# Patient Record
Sex: MALE | Race: WHITE
[De-identification: names, ages, dates, MRNs, and addresses within clinical notes are randomized per-mention and may not be internally consistent; named-entity substitution may affect disease eponyms.]

---

## 2018-12-03 ENCOUNTER — HOSPITAL ENCOUNTER (INPATIENT)
Dept: HOSPITAL 4 - SED | Age: 48
LOS: 3 days | Discharge: HOME | DRG: 291 | End: 2018-12-06
Payer: MEDICAID

## 2018-12-03 VITALS — SYSTOLIC BLOOD PRESSURE: 134 MMHG

## 2018-12-03 VITALS — SYSTOLIC BLOOD PRESSURE: 105 MMHG

## 2018-12-03 VITALS — SYSTOLIC BLOOD PRESSURE: 122 MMHG

## 2018-12-03 VITALS — SYSTOLIC BLOOD PRESSURE: 139 MMHG

## 2018-12-03 VITALS — SYSTOLIC BLOOD PRESSURE: 152 MMHG

## 2018-12-03 VITALS — SYSTOLIC BLOOD PRESSURE: 131 MMHG

## 2018-12-03 VITALS — SYSTOLIC BLOOD PRESSURE: 118 MMHG

## 2018-12-03 VITALS — SYSTOLIC BLOOD PRESSURE: 146 MMHG

## 2018-12-03 VITALS — SYSTOLIC BLOOD PRESSURE: 123 MMHG

## 2018-12-03 VITALS — HEIGHT: 68 IN | BODY MASS INDEX: 28.64 KG/M2 | WEIGHT: 189 LBS

## 2018-12-03 VITALS — SYSTOLIC BLOOD PRESSURE: 121 MMHG

## 2018-12-03 DIAGNOSIS — Z91.14: ICD-10-CM

## 2018-12-03 DIAGNOSIS — R00.1: ICD-10-CM

## 2018-12-03 DIAGNOSIS — Z91.15: ICD-10-CM

## 2018-12-03 DIAGNOSIS — E11.22: ICD-10-CM

## 2018-12-03 DIAGNOSIS — I50.9: ICD-10-CM

## 2018-12-03 DIAGNOSIS — I13.2: Primary | ICD-10-CM

## 2018-12-03 DIAGNOSIS — E87.1: ICD-10-CM

## 2018-12-03 DIAGNOSIS — R07.9: ICD-10-CM

## 2018-12-03 DIAGNOSIS — F12.90: ICD-10-CM

## 2018-12-03 DIAGNOSIS — Z86.73: ICD-10-CM

## 2018-12-03 DIAGNOSIS — Z87.891: ICD-10-CM

## 2018-12-03 DIAGNOSIS — N17.0: ICD-10-CM

## 2018-12-03 DIAGNOSIS — Z99.2: ICD-10-CM

## 2018-12-03 DIAGNOSIS — E87.5: ICD-10-CM

## 2018-12-03 DIAGNOSIS — N18.6: ICD-10-CM

## 2018-12-03 LAB
ALBUMIN SERPL BCP-MCNC: 3.5 G/DL (ref 3.4–4.8)
ALT SERPL W P-5'-P-CCNC: 15 U/L (ref 12–78)
ANION GAP SERPL CALCULATED.3IONS-SCNC: 10 MMOL/L (ref 5–15)
AST SERPL W P-5'-P-CCNC: 16 U/L (ref 10–37)
BASOPHILS # BLD AUTO: 0.1 K/UL (ref 0–0.2)
BASOPHILS NFR BLD AUTO: 1 % (ref 0–2)
BILIRUB SERPL-MCNC: 0.5 MG/DL (ref 0–1)
BUN SERPL-MCNC: 107 MG/DL (ref 8–21)
CALCIUM SERPL-MCNC: 8.8 MG/DL (ref 8.4–11)
CHLORIDE SERPL-SCNC: 96 MMOL/L (ref 98–107)
CREAT SERPL-MCNC: 19.78 MG/DL (ref 0.55–1.3)
EOSINOPHIL # BLD AUTO: 0.2 K/UL (ref 0–0.4)
EOSINOPHIL NFR BLD AUTO: 2.6 % (ref 0–4)
ERYTHROCYTE [DISTWIDTH] IN BLOOD BY AUTOMATED COUNT: 14.9 % (ref 9–15)
GFR SERPL CREATININE-BSD FRML MDRD: 3 ML/MIN (ref 90–?)
GLUCOSE SERPL-MCNC: 143 MG/DL (ref 70–99)
HCT VFR BLD AUTO: 44.7 % (ref 36–54)
HGB BLD-MCNC: 14.3 G/DL (ref 14–18)
INR PPP: 1 (ref 0.8–1.2)
LYMPHOCYTES # BLD AUTO: 0.9 K/UL (ref 1–5.5)
LYMPHOCYTES NFR BLD AUTO: 11.6 % (ref 20.5–51.5)
MCH RBC QN AUTO: 32 PG (ref 27–31)
MCHC RBC AUTO-ENTMCNC: 32 % (ref 32–36)
MCV RBC AUTO: 100 FL (ref 79–98)
MONOCYTES # BLD MANUAL: 0.5 K/UL (ref 0–1)
MONOCYTES # BLD MANUAL: 6.9 % (ref 1.7–9.3)
NEUTROPHILS # BLD AUTO: 5.9 K/UL (ref 1.8–7.7)
NEUTROPHILS NFR BLD AUTO: 77.9 % (ref 40–70)
PLATELET # BLD AUTO: 218 K/UL (ref 130–430)
POTASSIUM SERPL-SCNC: 6.9 MMOL/L (ref 3.5–5.1)
PROTHROMBIN TIME: 9.9 SECS (ref 9.5–12.5)
RBC # BLD AUTO: 4.48 MIL/UL (ref 4.2–6.2)
SODIUM SERPLBLD-SCNC: 132 MMOL/L (ref 136–145)
WBC # BLD AUTO: 7.6 K/UL (ref 4.8–10.8)

## 2018-12-03 PROCEDURE — 5A1D70Z PERFORMANCE OF URINARY FILTRATION, INTERMITTENT, LESS THAN 6 HOURS PER DAY: ICD-10-PCS

## 2018-12-03 PROCEDURE — G0378 HOSPITAL OBSERVATION PER HR: HCPCS

## 2018-12-03 RX ADMIN — HEPARIN SODIUM SCH UNITS: 5000 INJECTION, SOLUTION INTRAVENOUS; SUBCUTANEOUS at 20:49

## 2018-12-03 NOTE — NUR
Pt c/o SOB. RR 28/min. Sats 97%. Dialysis nurse here. Spoke with Dr. Lao and informed him 
of HR 29 on occasion, but mostly 30s and the rest of the VS. Orders left.

-------------------------------------------------------------------------------

Addendum: 12/03/18 at 1655 by Patti Skinner RN

-------------------------------------------------------------------------------

Pts monitor zenaida bowman at times, 2nd degree.

## 2018-12-03 NOTE — NUR
Patient will be admitted to care of .  Admitted to ICU unit.  Will go 
to room ICU6.  Belongings list completed.  Summary report printed. Report will 
be given at bedside. NALINI Poe received bedside report.

## 2018-12-03 NOTE — NUR
Not much difference in HR after atropine given. Tolerating dialysis. "Pt ststes he feels 
better already, I can feel it already."

## 2018-12-03 NOTE — NUR
Received pt from ER to ICU bed 6 via gurney. Pt alert and oriented. HR 35-42 upon arrival. 
Pt has a left arm AV shunt and an 18 IV to left hand from paramedics. Pt denies dizzyness. 
BP stable. 02 2L NC in use Sats 99%. Left upper arm with AV shunt with a good thrill and 
audible bruit. Lungs very diminished in bases. Pt denies SOB at this time. ABD soft with 
bowels sounds. No pedal edema. Socks on.Call bell in place and will continue to monitor pt.

## 2018-12-03 NOTE — NUR
PM SHIFT ASSESSMENT

Pt is alert and oriented. SR noted on monitor. SPO2 via NC @ 2L, RR even and unlabored. Skin 
intact. Pt is anuric. Safety precautions in place, call light within reach. Will continue to 
monitor.

## 2018-12-03 NOTE — NUR
Monitor shows HR 70's and SR. Pt comfortable in bed and states he feels much better. Eating 
dinner and has a good appetite.

## 2018-12-03 NOTE — NUR
PATIENT BIB AMBULANCE FOR LEFT SIDED CHEST PAIN x1.5 HOURS WHILE AT THE COURT 
HOUSE. PT A&Ox4. +DIZZINESS. CHEST PAIN PRESSURE LIKE AND SHARP. PAIN = 10/10. 
DR. CRUMP AT BEDSIDE AND AWARE. NEW ORDERS NOTED AND TO BE CARRIED OUT. PT 
STATES HAVING SHORTNESS OF BREATH AT ONSET OF CHEST PAIN. SPEAKING FULL 
SENTENCES AT THIS TIME. PT PLACED ON O2 VIA NC @ 2L/MIN. SIDE RAILS UP. WILL 
CONTINUE TO MONITOR.

## 2018-12-04 VITALS — SYSTOLIC BLOOD PRESSURE: 153 MMHG

## 2018-12-04 VITALS — SYSTOLIC BLOOD PRESSURE: 155 MMHG

## 2018-12-04 VITALS — SYSTOLIC BLOOD PRESSURE: 150 MMHG

## 2018-12-04 VITALS — SYSTOLIC BLOOD PRESSURE: 128 MMHG

## 2018-12-04 VITALS — SYSTOLIC BLOOD PRESSURE: 142 MMHG

## 2018-12-04 VITALS — SYSTOLIC BLOOD PRESSURE: 132 MMHG

## 2018-12-04 VITALS — SYSTOLIC BLOOD PRESSURE: 126 MMHG

## 2018-12-04 VITALS — SYSTOLIC BLOOD PRESSURE: 160 MMHG

## 2018-12-04 VITALS — SYSTOLIC BLOOD PRESSURE: 158 MMHG

## 2018-12-04 VITALS — SYSTOLIC BLOOD PRESSURE: 134 MMHG

## 2018-12-04 VITALS — SYSTOLIC BLOOD PRESSURE: 138 MMHG

## 2018-12-04 VITALS — SYSTOLIC BLOOD PRESSURE: 148 MMHG

## 2018-12-04 LAB
ANION GAP SERPL CALCULATED.3IONS-SCNC: 14 MMOL/L (ref 5–15)
BASOPHILS # BLD AUTO: 0 K/UL (ref 0–0.2)
BASOPHILS NFR BLD AUTO: 0.3 % (ref 0–2)
BUN SERPL-MCNC: 67 MG/DL (ref 8–21)
CALCIUM SERPL-MCNC: 8.8 MG/DL (ref 8.4–11)
CHLORIDE SERPL-SCNC: 95 MMOL/L (ref 98–107)
CREAT SERPL-MCNC: 15.04 MG/DL (ref 0.55–1.3)
EOSINOPHIL # BLD AUTO: 0.2 K/UL (ref 0–0.4)
EOSINOPHIL NFR BLD AUTO: 3.7 % (ref 0–4)
ERYTHROCYTE [DISTWIDTH] IN BLOOD BY AUTOMATED COUNT: 14.7 % (ref 9–15)
GFR SERPL CREATININE-BSD FRML MDRD: 5 ML/MIN (ref 90–?)
GLUCOSE SERPL-MCNC: 94 MG/DL (ref 70–99)
HCT VFR BLD AUTO: 45.1 % (ref 36–54)
HGB BLD-MCNC: 14.7 G/DL (ref 14–18)
LYMPHOCYTES # BLD AUTO: 0.8 K/UL (ref 1–5.5)
LYMPHOCYTES NFR BLD AUTO: 11.9 % (ref 20.5–51.5)
MCH RBC QN AUTO: 32 PG (ref 27–31)
MCHC RBC AUTO-ENTMCNC: 33 % (ref 32–36)
MCV RBC AUTO: 99 FL (ref 79–98)
MONOCYTES # BLD MANUAL: 0.4 K/UL (ref 0–1)
MONOCYTES # BLD MANUAL: 7 % (ref 1.7–9.3)
NEUTROPHILS # BLD AUTO: 5 K/UL (ref 1.8–7.7)
NEUTROPHILS NFR BLD AUTO: 77.1 % (ref 40–70)
PLATELET # BLD AUTO: 175 K/UL (ref 130–430)
POTASSIUM SERPL-SCNC: 4.5 MMOL/L (ref 3.5–5.1)
RBC # BLD AUTO: 4.55 MIL/UL (ref 4.2–6.2)
SODIUM SERPLBLD-SCNC: 133 MMOL/L (ref 136–145)
WBC # BLD AUTO: 6.4 K/UL (ref 4.8–10.8)

## 2018-12-04 RX ADMIN — HEPARIN SODIUM SCH UNITS: 5000 INJECTION, SOLUTION INTRAVENOUS; SUBCUTANEOUS at 08:23

## 2018-12-04 RX ADMIN — Medication SCH MG: at 11:29

## 2018-12-04 RX ADMIN — Medication SCH TAB: at 11:30

## 2018-12-04 RX ADMIN — HEPARIN SODIUM SCH UNITS: 5000 INJECTION, SOLUTION INTRAVENOUS; SUBCUTANEOUS at 20:50

## 2018-12-04 NOTE — NUR
rounds

patient is resting in bed, talking on the phone, vital signs done, no other needs at this 
time, patient refused bed alarm even after providing education on the benefits of it, bed at 
the lowest position, two side rails up, call light within reach, fall and aspiration 
precautions in place, limb alert band on.

## 2018-12-04 NOTE — NUR
OPENING NOTE

Patient resting in the bed comfortable. No acute distress. Respiration even and unlabored. 
AAO x 4. Denied of pain. Skin warm and dry to touch. SL intact to RAC, no redness, no 
swelling, patent. AV shunt intact to YUMI with thrill/bruit present. No bleeding, no swelling 
noted. Discussed the safety measure maintained. Bed locked in low position, side rails up. 
Call light within reached. Refused bed alarm, risk and benefit explained, verbally 
understanding. Will continue to monitor.

## 2018-12-04 NOTE — NUR
BP follow up

blood pressure was 134/72, will continue to monitor, patient is resting in bed, no other 
needs at this time, patient refused bed alarm even after providing education on the benefits 
of it, bed at the lowest position, two side rails up, call light within reach, fall and 
aspiration precautions in place, limb alert band on.

## 2018-12-04 NOTE — NUR
ROUND

Patient resting in the bed with eyes closed. No acute distress. Respiration even and 
unlabored. Safety measure maintained. Bed locked in low position, side rails up. Call light 
within reached. Continue to monitor.

## 2018-12-04 NOTE — NUR
opening note

patient is resting in bed, A&Ox4, assessment completed, educated on call light system and 
plan of care, patient verbalized understanding, no other needs at this time, patient refused 
bed alarm even after providing education on the benefits of it, bed at the lowest position, 
two side rails up, call light within reach, fall and aspiration precautions in place, limb 
alert band on.

## 2018-12-04 NOTE — NUR
Nutrition Update



Ghassan Scale 16 noted.

Pt admitted for symptomatic bradycardiac.

Diet: renal

BMI: 27.7 kg/m2



RD to follow per nutrition care standards.

## 2018-12-04 NOTE — NUR
DOWNGRADE TO TELEMETRY



DR. PURVIS AT BEDSIDE. ORDER RECEIVED TO TRANSFER TO TELEMETRY.  CONTINUING CARE IN ICU-6 
UNTIL TELEMETRY NURSE BECOMES AVAILABLE

## 2018-12-04 NOTE — NUR
closing note

walked with patient in [place], assisted back to bed, no other needs at this time, will endorse 
report to noc shift nurse, patient refused bed alarm, bed in lowest position, two side rails 
up, call light within reach, fall and aspiration precautions in place, limb alert band on.

## 2018-12-04 NOTE — NUR
rounds

vital signs completed, blood pressure was 172/102, will continue to monitor, patient is 
resting in bed, no other needs at this time, patient refused bed alarm even after providing 
education on the benefits of it, bed at the lowest position, two side rails up, call light 
within reach, fall and aspiration precautions in place, limb alert band on.

## 2018-12-04 NOTE — NUR
ROUNDS

patient is resting in bed, eyes closed breathing nonlabored, no other needs at this time, 
patient refused bed alarm even after providing education on the benefits of it, bed at the 
lowest position, two side rails up, call light within reach, fall and aspiration precautions 
in place, limb alert band on.

## 2018-12-04 NOTE — NUR
RN Opening Note



SBAR report received from endorsing nurse at bedside. Pt educated on unit safety and 
demonstrated use of call light. Bed in lowest position. Call light within reach. 



See VS flowsheet.

## 2018-12-05 VITALS — SYSTOLIC BLOOD PRESSURE: 147 MMHG

## 2018-12-05 VITALS — SYSTOLIC BLOOD PRESSURE: 162 MMHG

## 2018-12-05 VITALS — SYSTOLIC BLOOD PRESSURE: 128 MMHG

## 2018-12-05 VITALS — SYSTOLIC BLOOD PRESSURE: 149 MMHG

## 2018-12-05 VITALS — SYSTOLIC BLOOD PRESSURE: 176 MMHG

## 2018-12-05 LAB
ANION GAP SERPL CALCULATED.3IONS-SCNC: 18 MMOL/L (ref 5–15)
BASOPHILS # BLD AUTO: 0 K/UL (ref 0–0.2)
BASOPHILS NFR BLD AUTO: 0.3 % (ref 0–2)
BUN SERPL-MCNC: 87 MG/DL (ref 8–21)
CALCIUM SERPL-MCNC: 8.4 MG/DL (ref 8.4–11)
CHLORIDE SERPL-SCNC: 95 MMOL/L (ref 98–107)
CREAT SERPL-MCNC: 17.81 MG/DL (ref 0.55–1.3)
EOSINOPHIL # BLD AUTO: 0.2 K/UL (ref 0–0.4)
EOSINOPHIL NFR BLD AUTO: 4 % (ref 0–4)
ERYTHROCYTE [DISTWIDTH] IN BLOOD BY AUTOMATED COUNT: 15 % (ref 9–15)
GFR SERPL CREATININE-BSD FRML MDRD: 4 ML/MIN (ref 90–?)
GLUCOSE SERPL-MCNC: 82 MG/DL (ref 70–99)
HCT VFR BLD AUTO: 43.9 % (ref 36–54)
HGB BLD-MCNC: 14.1 G/DL (ref 14–18)
LYMPHOCYTES # BLD AUTO: 1 K/UL (ref 1–5.5)
LYMPHOCYTES NFR BLD AUTO: 18.8 % (ref 20.5–51.5)
MCH RBC QN AUTO: 32 PG (ref 27–31)
MCHC RBC AUTO-ENTMCNC: 32 % (ref 32–36)
MCV RBC AUTO: 99 FL (ref 79–98)
MONOCYTES # BLD MANUAL: 0.5 K/UL (ref 0–1)
MONOCYTES # BLD MANUAL: 9.2 % (ref 1.7–9.3)
NEUTROPHILS # BLD AUTO: 3.7 K/UL (ref 1.8–7.7)
NEUTROPHILS NFR BLD AUTO: 67.7 % (ref 40–70)
PLATELET # BLD AUTO: 162 K/UL (ref 130–430)
POTASSIUM SERPL-SCNC: 5.2 MMOL/L (ref 3.5–5.1)
RBC # BLD AUTO: 4.46 MIL/UL (ref 4.2–6.2)
SODIUM SERPLBLD-SCNC: 133 MMOL/L (ref 136–145)
WBC # BLD AUTO: 5.4 K/UL (ref 4.8–10.8)

## 2018-12-05 PROCEDURE — 5A1D70Z PERFORMANCE OF URINARY FILTRATION, INTERMITTENT, LESS THAN 6 HOURS PER DAY: ICD-10-PCS

## 2018-12-05 RX ADMIN — HEPARIN SODIUM SCH UNITS: 5000 INJECTION, SOLUTION INTRAVENOUS; SUBCUTANEOUS at 21:06

## 2018-12-05 RX ADMIN — Medication SCH MG: at 08:08

## 2018-12-05 RX ADMIN — HEPARIN SODIUM SCH UNITS: 5000 INJECTION, SOLUTION INTRAVENOUS; SUBCUTANEOUS at 08:10

## 2018-12-05 RX ADMIN — Medication SCH TAB: at 08:08

## 2018-12-05 NOTE — NUR
Patient request

to go to [facility] to see his grandson who is too young to visit inside the hospital, walked 
with the patient to the [facility] steady gait, will continue to monitor the patient. 

-------------------------------------------------------------------------------

Addendum: 12/05/18 at 1319 by Kwaku Montes RN

-------------------------------------------------------------------------------

Patient back to unit, stable condition.

## 2018-12-05 NOTE — NUR
Medications

patient resting in bed, awake, denies pain, educated on morning medications uses and 
potential side effects, he verbalized understanding and tolerated well, no other needs at 
this time, bed in lowest position, two side rails up, call light within reach, fall and 
aspiration precautions in place, continuing to monitor

## 2018-12-05 NOTE — NUR
Dietitian Recommendations 



* Recommend renal, 100 gm protein diet

LP, RD



Please refer to Nutrition Assessment for details.

## 2018-12-05 NOTE — NUR
RN rounds

patient out of bed, ambulating around his room and in the hallway, steady gait, in stable 
condition, continuing to monitor.

## 2018-12-05 NOTE — NUR
Hemodialysis/BP med

HD complete at this time, 3L off, patient BP improved will not administer one time dose of 
hydralazine, will re-check BP and administer 1400 dose if indicated, patient denies pain, in 
stable condition, continuing to monitor, no other needs at this time, bed in lowest 
position, two side rails up, call light within reach, fall and aspiration precautions in 
place. 

-------------------------------------------------------------------------------

Addendum: 12/05/18 at 1231 by Kwaku Montes RN

-------------------------------------------------------------------------------

Administered one time dose for BP med per MD orders, current HR 71 /101, continuing to 
monitor.

## 2018-12-05 NOTE — NUR
CLOSING NOTE

Patient resting in the bed and watching TV. No acute distress. Respiration even and 
unlabored. Denied of pain. Skin warm and dry to touch. SL intact to RAC, no redness, no 
swelling, patent. AV shunt intact to YUMI with thrill/bruit present. No bleeding, no swelling 
noted. All needs met. Hourly rounding during shift. Safety measure maintained. Bed locked in 
low position, side rails up. Call light within reached. Refused bed alarm, risk and benefit 
explained, verbally understanding. Will endorse to morning shift nurse.

## 2018-12-05 NOTE — NUR
ROUND

Patient resting in the bed with eyes closed. No acute distress. Respiration even and 
unlabored. Safety measure maintained. Call light within reached. Bed locked in low position, 
side rails up. Continue to monitor.

## 2018-12-05 NOTE — NUR
OPENING NOTES



RECEIVED PATIENT IN BED AAO X4. DENIES ANY PAIN. BREATHING UNLABORED ON ROOM AIR. PLAN OF 
CARE REVIEWED WITH PATIENT. CALL LIGHT WITHIN REACH. BED IN LOWEST LOCKED POSITION.

## 2018-12-05 NOTE — NUR
RN rounds/BP meds

patient resting in bed, awake, denies pain, educated on medications, uses and potential side 
effects, HR 81 /110 at this time, patient tolerated well, provided with linen pants 
per request, no other needs at this time, bed in lowest position, two side rails up, call 
light within reach, fall and aspiration precautions in place.

## 2018-12-05 NOTE — NUR
ROUND

Patient resting in the bed with eyes closed. No acute distress. Safety measure maintained. 
Bed locked in low position, side rails up. Call light within reached. Continue to monitor.

## 2018-12-05 NOTE — NUR
Closing note

patient resting in bed, awake, denies pain, stable condition, all needs met, will endorse 
report to NOC shift nurse, bed in lowest position, two side rails up, call light within 
reach, fall and aspiration precautions in place.

## 2018-12-05 NOTE — NUR
MED PASS



DUE MEDICATIONS GIVEN AND TOLERATED. DENIES PAIN/DISCOMFORT AT THIS TIME. HS SNACK PROVIDED.

## 2018-12-05 NOTE — NUR
AM rounds

patient resting in bed, a/ox4, denies pain, assessment complete, educated on plan of care, 
safety and call light system, he verbalized understanding, call light within reach, fall and 
aspiration precautions in place.

## 2018-12-06 VITALS — SYSTOLIC BLOOD PRESSURE: 140 MMHG

## 2018-12-06 VITALS — SYSTOLIC BLOOD PRESSURE: 125 MMHG

## 2018-12-06 VITALS — SYSTOLIC BLOOD PRESSURE: 144 MMHG

## 2018-12-06 VITALS — SYSTOLIC BLOOD PRESSURE: 105 MMHG

## 2018-12-06 LAB
ANION GAP SERPL CALCULATED.3IONS-SCNC: 17 MMOL/L (ref 5–15)
BASOPHILS # BLD AUTO: 0 K/UL (ref 0–0.2)
BASOPHILS NFR BLD AUTO: 0.5 % (ref 0–2)
BUN SERPL-MCNC: 75 MG/DL (ref 8–21)
CALCIUM SERPL-MCNC: 8.6 MG/DL (ref 8.4–11)
CHLORIDE SERPL-SCNC: 93 MMOL/L (ref 98–107)
CREAT SERPL-MCNC: 15.49 MG/DL (ref 0.55–1.3)
EOSINOPHIL # BLD AUTO: 0.2 K/UL (ref 0–0.4)
EOSINOPHIL NFR BLD AUTO: 3.4 % (ref 0–4)
ERYTHROCYTE [DISTWIDTH] IN BLOOD BY AUTOMATED COUNT: 14.6 % (ref 9–15)
GFR SERPL CREATININE-BSD FRML MDRD: 4 ML/MIN (ref 90–?)
GLUCOSE SERPL-MCNC: 85 MG/DL (ref 70–99)
HCT VFR BLD AUTO: 47.4 % (ref 36–54)
HGB BLD-MCNC: 16 G/DL (ref 14–18)
LYMPHOCYTES # BLD AUTO: 1 K/UL (ref 1–5.5)
LYMPHOCYTES NFR BLD AUTO: 19.3 % (ref 20.5–51.5)
MCH RBC QN AUTO: 33 PG (ref 27–31)
MCHC RBC AUTO-ENTMCNC: 34 % (ref 32–36)
MCV RBC AUTO: 98 FL (ref 79–98)
MONOCYTES # BLD MANUAL: 0.4 K/UL (ref 0–1)
MONOCYTES # BLD MANUAL: 8.3 % (ref 1.7–9.3)
NEUTROPHILS # BLD AUTO: 3.7 K/UL (ref 1.8–7.7)
NEUTROPHILS NFR BLD AUTO: 68.5 % (ref 40–70)
PLATELET # BLD AUTO: 181 K/UL (ref 130–430)
POTASSIUM SERPL-SCNC: 5.1 MMOL/L (ref 3.5–5.1)
RBC # BLD AUTO: 4.83 MIL/UL (ref 4.2–6.2)
SODIUM SERPLBLD-SCNC: 133 MMOL/L (ref 136–145)
WBC # BLD AUTO: 5.3 K/UL (ref 4.8–10.8)

## 2018-12-06 RX ADMIN — Medication SCH TAB: at 08:08

## 2018-12-06 RX ADMIN — HEPARIN SODIUM SCH UNITS: 5000 INJECTION, SOLUTION INTRAVENOUS; SUBCUTANEOUS at 08:11

## 2018-12-06 RX ADMIN — Medication SCH MG: at 08:08

## 2018-12-06 NOTE — NUR
rounds

patient is resting in bed, informed about discharge orders from MD, patient verbalized 
understanding and ride will be here around 1400, no other needs at this time, bed alarm 
refused by patient, two side rails up, call light within reach, fall and aspiration 
precautions in place.

## 2018-12-06 NOTE — NUR
D/C Patient

Patient given medication reconciliation form and D/C instructions. Exit Care provided. 
Patient verbalized understanding. MD discussed with patient the results and treatment 
provided. Ambulatory with steady gait for discharge to home. Patient in stable condition, ID 
band removed. IV catheter removed, intact and dressing applied, no active bleeding. Patient 
educated on pain management. All belongings sent with patient. Patient is scheduled to met 
with PCP on saturday 12/08/18 for dialysis treatment.

## 2018-12-06 NOTE — NUR
Medication

patient is resting in bed, wife present at the bedside, educated on medication uses and side 
effects, patient verbalized understanding and tolerated well, no other needs at this time, 
patient refused bed alarm even after providing education on the benefits of it, bed at the 
lowest position, two side rails up, call light within reach, fall and aspiration precautions 
in place, limb alert band on.

## 2018-12-06 NOTE — NUR
Medication

patient is resting in bed, informed me that his wife will be the room picking him up, 
educated on medication uses and side effects, patient verbalized understanding and tolerated 
well, no other needs at this time, patient refused bed alarm even after providing education 
on the benefits of it, bed at the lowest position, two side rails up, call light within 
reach, fall and aspiration precautions in place, limb alert band on.

## 2018-12-06 NOTE — NUR
CLOSING NOTES





PATIENT RESTING IN BED. BREATHING UNLABORED. NO CHANGE OF CONDITION DURING SHIFT. NEEDS 
ATTENDED. CALL LIGHT WITH IN REACH. SIDE RAILS UP X2. BED IN LOWEST LOCKED POSITION.

## 2020-06-04 NOTE — NUR
Medication

patient is resting in bed, medications were administered late because patient was 
transferred to unit after the scheduled time and orders and verification did not happen 
until after his transfer, educated on medication uses and side effects, patient verbalized 
understanding and tolerated well, no other needs at this time, patient refused bed alarm 
even after providing education on the benefits of it, bed at the lowest position, two side 
rails up, call light within reach, fall and aspiration precautions in place, limb alert band 
on. Kari RODRIGUEZ RN
